# Patient Record
Sex: FEMALE | Race: WHITE
[De-identification: names, ages, dates, MRNs, and addresses within clinical notes are randomized per-mention and may not be internally consistent; named-entity substitution may affect disease eponyms.]

---

## 2018-12-18 ENCOUNTER — HOSPITAL ENCOUNTER (EMERGENCY)
Dept: HOSPITAL 80 - CED | Age: 68
Discharge: HOME | End: 2018-12-18
Payer: COMMERCIAL

## 2018-12-18 VITALS — DIASTOLIC BLOOD PRESSURE: 90 MMHG | SYSTOLIC BLOOD PRESSURE: 160 MMHG
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
## 2018-12-18 DIAGNOSIS — S01.81XA: Primary | ICD-10-CM

## 2018-12-18 PROCEDURE — 0HQ1XZZ REPAIR FACE SKIN, EXTERNAL APPROACH: ICD-10-PCS | Performed by: EMERGENCY MEDICINE

## 2018-12-18 NOTE — EDPHY
H & P


Time Seen by Provider: 12/18/18 11:44


HPI/ROS: 





HPI


Facial laceration.





68-year-old female by private vehicle with her daughter.  This patient was 

walking her dog when her dog suddenly pulled her causing her to trip and fall 

forward.  She was wearing a pair of sunglasses.  She hit the right side of her 

face on a sidewalk cement surface.  Her sunglasses broke and caused a 

laceration above her right eyebrow and over her right maxilla.  She denies loss 

of consciousness.  She complains of localized pain to the wound areas.  She 

denies any significant headache.  No neck pain.  No loss of sensation or 

weakness in her extremities.  She is not on antiplatelet or anticoagulant 

medications.  She has had a tetanus shot within the last year.





ROS:





Constitutional:  No fever, no chills.  No weakness.


Eyes:  No discharge.  No changes in vision.


Musculoskeletal:  No back pain.  No neck pain.  She has some soreness in her 

right knee and right wrist.


Skin:  No rashes.  Facial lacerations as above.


Neurological:  No headache.  No focal weakness or altered sensation.





Past medical history:  Seizure disorder and history of brain aneurysm with 

clipping age 43. 





Social history:  Nonsmoker.  No alcohol.  Here with her daughter.





Physical Exam:





General Appearance:  Alert, no distress.  This patient is responding to 

questions appropriately and in full sentences.  This patient appears well-

hydrated and well-nourished.


Head:  Normocephalic atraumatic except noted.


Face:  Facial bones are stable on palpation.  She has a jagged and macerated 4 

cm laceration over and just above and lateral to the right eyebrow.  There is 

some associated underlying swelling to her right brow ridge.  No bony step-off 

or deformity noted on palpation of this area.  She has a 1.5 cm elliptical 

laceration over the right maxilla.  No bony deformity or step-off noted on 

palpation of this area.  Please see wound care notes for further details.


Eyes:  Pupils equal and round and reactive to light, no pallor or injection.  

No lid erythema or edema.


ENT, Mouth:  Mucous membranes moist.  Dentition is intact.  No malocclusion of 

the jaw.  No tongue lacerations or abrasions.  Pharynx is clear.  The bilateral 

nasal canals are clear.  No septal hematoma.


Respiratory:  There are no retractions, lungs are clear to auscultation with 

good air movement bilaterally.  Chest wall is stable to AP and lateral 

palpation.


Cardiovascular:  Regular rate and rhythm.  No murmur.


Gastrointestinal:  Abdomen is soft and nontender, no masses, bowel sounds 

normal.


Neurological:  Motor sensory function is intact.  Cranial nerves are normal.  

Cerebellar function intact.


Skin:  Warm and dry, no rashes.  Facial lacerations as above.  Superficial 

laceration to anterior aspect of right knee.


Musculoskeletal:  Neck is supple and nontender.  The trachea is midline.  No 

midline cervical, thoracic, lumbar or sacral tenderness on palpation.  No flank 

tenderness on palpation.


Right knee has a superficial abrasion over the mid patella.  She does have 

tenderness on palpation over this area and some stiffness and discomfort with 

flexion and extension both actively and passively.  The right lower extremity 

is neurovascularly intact.  She has some tenderness on palpation over the right 

distal radial aspect of her wrist.  No snuffbox tenderness on palpation.  No 

pain on axial compression of her right thumb.  The bony aspects of her hand and 

digits are nontender on palpation.  She does not have significant tenderness on 

axial compression of the right wrist.  The right upper extremity is 

neurovascularly intact.  Extremities are symmetrical, full range of motion 

except noted.  All joints in the bilateral upper and bilateral lower 

extremities range without pain or impingement except noted.  No tenderness on 

palpation of the long bones in the bilateral upper and bilateral lower 

extremities except noted.


Psychiatric:  No agitation.  No depression.





Database:





EKG:





Imaging:





Right wrist x-ray:  Negative for fracture, subluxation, dislocation.  

Interpreted by me.





Right knee x-ray:  Negative for fracture, subluxation, dislocation.  

Interpreted by me.





CT head:  Soft tissue swelling in right brow ridge in right maxilla.  Otherwise 

no skull or facial fracture identified.  The brain is unremarkable.  Results 

were discussed with staff radiologist Dr. Nicola Beaver.





Procedures:





Procedure:  Laceration repair.  1. 





Verbal consent was obtained from the patient.  The 4 cm laceration on the right 

brow ridge was anesthetized in the usual fashion.  The wound was irrigated with 

copious amounts of sterile normal saline, draped and explored to its base with 

a gloved finger.  There were no deep structures involved.  No foreign body was 

identified.  The wound was repaired with 14, 6.0 Prolene sutures placed in 

interrupted fashion.  The wound repair was tolerated well and there were no 

complications.  The procedure was performed by myself.





Procedure:  Laceration repair.  2. 





Verbal consent was obtained from the patient.  The 1.5 sent laceration on the 

right maxilla was anesthetized in the usual fashion.  The wound was irrigated, 

draped and explored to its base with a gloved finger.  There were no deep 

structures involved.  No foreign body was identified.  The wound was repaired 

with 5, 6.0 Prolene sutures placed in interrupted fashion.  The wound repair 

was tolerated well and there were no complications.  The procedure was 

performed by myself.





Emergency department course:





After suture repair is noted, wound care was discussed with the patient and her 

daughter.  I explained to them that because of the jagged and macerated nature 

of her facial wounds that she would have some residual scarring.  She 

understands this.  I did discuss plastic surgery referral and consultation.  

She does not want to do this at this time and clearly wanted the lacerations 

repaired in the emergency department.  Head injury precautions reviewed with 

her and her daughter.  She feels comfortable going home with her daughter and 

grandson who is now in the room.  Follow-up and return to emergency department 

precautions reviewed with them.  All of their questions were answered.  The 

patient was discharged home in good condition with her daughter and grandson.





Differential Diagnosis:





The differential diagnosis on this patient includes but is not limited to 

mechanical fall, facial lacerations, facial contusion.  Facial bone fracture.  

Orbital fracture, spinal injury, traumatic brain injury unlikely.  This 

represents a partial list of diagnoses considered.  These considerations are 

based on history, physical exam, past history, reassessment and diagnostic 

testing.


Smoking Status: Never smoked


Constitutional: 


 Initial Vital Signs











Temperature (C)  36.8 C   12/18/18 11:50


 


Heart Rate  67   12/18/18 11:50


 


Respiratory Rate  16   12/18/18 11:50


 


Blood Pressure  166/96 H  12/18/18 11:50


 


O2 Sat (%)  93   12/18/18 11:50








 











O2 Delivery Mode               Room Air














Allergies/Adverse Reactions: 


 





No Known Allergies Allergy (Unverified 12/18/18 11:58)


 








Home Medications: 














 Medication  Instructions  Recorded


 


Anti Deppressant  12/18/18


 


Fexofenadine HCl  12/18/18


 


Hydrocodone/APAP 5/325 [Norco 1 - 2 tab PO Q4-6PRN PRN #10 tab 12/18/18





5/325 (*)]  


 


LaMICtal  12/18/18


 


Lisinopril  12/18/18


 


Vimpat 50 mg (*)  12/18/18


 


Vitamin D3  12/18/18














Departure





- Departure


Disposition: Home, Routine, Self-Care


Clinical Impression: 


 Fall from ground level, Face lacerations, Facial contusion





Condition: Good


Instructions:  Care For Your Stitches (ED), Head Injury (ED), Facial Laceration 

(ED)


Additional Instructions: 


Read and follow provided instructions.





Your sutures should be removed in 5-7 days.  This can be done here at the 

emergency department or by her primary care physician.





14 interrupted sutures were placed in the large wound and 5 interrupted sutures 

were placed in the small wound.





Narcotic pain medication:  1-2 every 4-6 hours as needed for pain.  Do not 

drive while on this medication.





Return to the emergency department for worsening worsening headache, confusion, 

nausea and vomiting, facial pain, signs of wound infection or other serious 

concerns.


Referrals: 


[facility] [Other] - As per Instructions


Prescriptions: 


Hydrocodone/APAP 5/325 [Norco 5/325 (*)] 1 - 2 tab PO Q4-6PRN PRN #10 tab


 PRN Reason: Pain, Moderate

## 2018-12-21 ENCOUNTER — HOSPITAL ENCOUNTER (EMERGENCY)
Dept: HOSPITAL 80 - CED | Age: 68
Discharge: HOME | End: 2018-12-21
Payer: COMMERCIAL

## 2018-12-21 VITALS — DIASTOLIC BLOOD PRESSURE: 87 MMHG | SYSTOLIC BLOOD PRESSURE: 178 MMHG

## 2018-12-21 DIAGNOSIS — H53.9: ICD-10-CM
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
## 2018-12-21 DIAGNOSIS — S01.81XD: ICD-10-CM

## 2018-12-21 DIAGNOSIS — L08.9: Primary | ICD-10-CM

## 2018-12-21 NOTE — EDPHY
H & P


Time Seen by Provider: 12/21/18 13:18


HPI/ROS: 





This patient was seen here 2 days ago with fall resulting in right periorbital 

hematoma and lacerations requiring sutures.  She had head CT that was negative 

for intracranial pathology and negative for facial fractures.  She was 

discharged home on hydrocodone total of 10 5 mg tabs and had relief of her 

symptoms with that medication but is running out with the last dose this 

morning half tab shortly after awakening and reports increasing pain to the 

area of the right zygoma/hematoma over the past 24 hr now moderate intensity 

despite the half hydrocodone/Tylenol.  She feels there is also slight increase 

in swelling over the past 24 hr and demonstrates this by comparing photograph 

of herself yesterday to today and do appreciate more edema to the area of the 

nasal bridge and infraorbital hematoma.





ROS:  Constitutional:  No fevers or chills


HEENT:  She reports intermittent subtle diplopia when she is tired.  Currently 

she does not have diplopia.  She has noticed this over the past couple days.  

She denies any other vision changes.  She denies any pain to the actual globe 

of the right eye.


Neuro:  No generalized headache.  No focal numbness tingling weakness.  No 

confusion.


GI:  No nausea vomiting


7 point review of symptoms is performed and otherwise negative with exception 

of pertinent positives and negatives listed in HPI and ROS





Past Medical/Surgical History: 





Otherwise healthy.  Fall 2 days ago with hematoma sutures and negative CT head


Smoking Status: Never smoked


Physical Exam: 





Physical exam:


Vital signs are normal


General:  Patient is in no acute distress.


HEENT:  The patient has infraorbital hematoma with clean dry intact sutures 

with Prolene.  There is slight warmth to touch surrounding this area and edema 

extends the nasal bridge.  The upper laceration with sutures is clean dry 

intact without warmth or erythema.   Nose atraumatic.  Ears:  Clear bilaterally 

with no hemotympanum.  Oropharynx:  No dental trauma or malocclusion.  No 

intraoral lacerations.


Eyes:  Pupils are equal and reactive to light.  Extraocular motions are intact.

  Optic fundi:  Clear with no papilledema or hemorrhage.  Visual acuity.  

Status post Lasic on the right she has 20/30, 20/100 OS without Lasic repair to 

the left eye and 20/25 both eyes.  Appreciate no significant conjunctival 

injection or discharge to the right eye or left eye.


Neck:  Trachea is midline with no stridor.  The patient has no midline neck 

tenderness and retains a full range of motion without increase in pain.


Lungs:  No respiratory distress


Cardiac:  Capillary refill intact throughout


Neuro:  GCS of 15.  Cranial nerves II through XII intact.   No sensory or motor 

deficits are appreciated.





Initial differential diagnosis:  Increased pain  due to running out of 

hydrocodone, minor wound infection to the infraorbital region, retinal injury, 

other ocular injury


Constitutional: 


 Initial Vital Signs











Temperature (C)  37.2 C   12/21/18 13:15


 


Heart Rate  59 L  12/21/18 13:15


 


Respiratory Rate  16   12/21/18 13:15


 


Blood Pressure  178/87 H  12/21/18 13:15


 


O2 Sat (%)  97   12/21/18 13:15








 











O2 Delivery Mode               Room Air














Allergies/Adverse Reactions: 


 





No Known Allergies Allergy (Unverified 12/21/18 13:21)


 








Home Medications: 














 Medication  Instructions  Recorded


 


Anti Deppressant  12/18/18


 


Fexofenadine HCl  12/18/18


 


Hydrocodone/APAP 5/325 [Norco 1 - 2 tab PO Q4-6PRN PRN #10 tab 12/18/18





5/325 (*)]  


 


LaMICtal  12/18/18


 


Lisinopril  12/18/18


 


Vimpat 50 mg (*)  12/18/18


 


Vitamin D3  12/18/18


 


Cephalexin [Keflex (*)] 500 mg PO TID #21 cap 12/21/18


 


Hydrocodone/APAP 5/325 [Norco 1 - 2 tab PO Q4PRN PRN #12 tab 12/21/18





5/325 (*)]  














MDM/Departure





- MDM


Medications Given: 


 








Discontinued Medications





Hydrocodone Bitart/Acetaminophen (Norco 5/325)  1 tab PO EDNOW ONE


   Stop: 12/21/18 13:42


   Last Admin: 12/21/18 13:58 Dose:  1 tab


Cephalexin HCl (Keflex)  500 mg PO EDNOW ONE


   PRN Reason: Protocol


   Stop: 12/21/18 13:52


   Last Admin: 12/21/18 14:01 Dose:  500 mg





ED Course/Re-evaluation: 





I spoke with on-call ophthalmologist, Dr. Henson who will see the patient in 

his office this afternoon for further ocular exam





Patient is treated with 1st dose of Keflex here and a dose of Tylenol/

hydrocodone.  Her grandson will drive her to see Dr. Henson.





I reviewed the patient's CT images from her initial evaluation here in explain 

that there is a possibility of a small retained foreign body contributing to 

minor wound infection but the I see no clinical indication at this time to 

remove sutures-no evidence of abscess or other significant infection than 

warrant acute debridement.  Instead, we both agree and plan to treat her with 

Keflex antibiotic.  She will have further evaluation by Ophthalmology to rule 

out any undetected ocular injuries given subtle diplopia at times since the 

injury by her description.  Currently no obvious asymmetry and extraocular 

motions or active diplopia at this time.











- Depart


Disposition: Home, Routine, Self-Care


Clinical Impression: 


 Wound infection, Vision changes





Condition: Fair


Instructions:  Care For Your Stitches (ED), Blurred Vision (ED)


Additional Instructions: 


Diagnosis:  Superficial wound infection 2. Change in vision





Plan:  Proceed directly to [facility] to see Dr. Henson-ophthalmology doctor for further evaluation of your eye.





Hydrocodone/Tylenol as needed for pain control





Keflex antibiotic for superficial wound infection





Return for any significant worsening despite the treatment


Prescriptions: 


Cephalexin [Keflex (*)] 500 mg PO TID #21 cap


Hydrocodone/APAP 5/325 [Norco 5/325 (*)] 1 - 2 tab PO Q4PRN PRN #12 tab


 PRN Reason: Pain


Referrals: 


NONE *PRIMARY CARE P,. [Primary Care Provider] - As per Instructions


Gordy Henson MD [Medical Doctor] - As per Instructions